# Patient Record
Sex: MALE | Race: WHITE | Employment: FULL TIME | URBAN - METROPOLITAN AREA
[De-identification: names, ages, dates, MRNs, and addresses within clinical notes are randomized per-mention and may not be internally consistent; named-entity substitution may affect disease eponyms.]

---

## 2023-05-12 ENCOUNTER — APPOINTMENT (OUTPATIENT)
Dept: GENERAL RADIOLOGY | Age: 38
End: 2023-05-12

## 2023-05-12 ENCOUNTER — HOSPITAL ENCOUNTER (EMERGENCY)
Age: 38
Discharge: HOME | End: 2023-05-12
Attending: EMERGENCY MEDICINE

## 2023-05-12 VITALS
RESPIRATION RATE: 16 BRPM | SYSTOLIC BLOOD PRESSURE: 144 MMHG | HEART RATE: 91 BPM | OXYGEN SATURATION: 96 % | DIASTOLIC BLOOD PRESSURE: 112 MMHG | TEMPERATURE: 97.7 F

## 2023-05-12 DIAGNOSIS — S93.491A SPRAIN OF ANTERIOR TALOFIBULAR LIGAMENT OF RIGHT ANKLE, INITIAL ENCOUNTER: Primary | ICD-10-CM

## 2023-05-12 PROCEDURE — 29515 APPLICATION SHORT LEG SPLINT: CPT

## 2023-05-12 PROCEDURE — 96372 THER/PROPH/DIAG INJ SC/IM: CPT

## 2023-05-12 PROCEDURE — 99284 EMERGENCY DEPT VISIT MOD MDM: CPT

## 2023-05-12 PROCEDURE — 73610 X-RAY EXAM OF ANKLE: CPT

## 2023-05-12 PROCEDURE — 6360000002 HC RX W HCPCS: Performed by: EMERGENCY MEDICINE

## 2023-05-12 RX ORDER — PYRAZINAMIDE 500 MG/1
1 TABLET ORAL EVERY 6 HOURS PRN
Qty: 20 TABLET | Refills: 0 | Status: SHIPPED | OUTPATIENT
Start: 2023-05-12 | End: 2023-05-17

## 2023-05-12 RX ORDER — IBUPROFEN 600 MG/1
600 TABLET ORAL EVERY 8 HOURS PRN
Qty: 30 TABLET | Refills: 0 | Status: SHIPPED | OUTPATIENT
Start: 2023-05-12 | End: 2023-05-22

## 2023-05-12 RX ORDER — KETOROLAC TROMETHAMINE 30 MG/ML
30 INJECTION, SOLUTION INTRAMUSCULAR; INTRAVENOUS
Status: COMPLETED | OUTPATIENT
Start: 2023-05-12 | End: 2023-05-12

## 2023-05-12 RX ADMIN — KETOROLAC TROMETHAMINE 30 MG: 30 INJECTION, SOLUTION INTRAMUSCULAR at 09:06

## 2023-05-12 ASSESSMENT — LIFESTYLE VARIABLES
HOW MANY STANDARD DRINKS CONTAINING ALCOHOL DO YOU HAVE ON A TYPICAL DAY: PATIENT DOES NOT DRINK
HOW OFTEN DO YOU HAVE A DRINK CONTAINING ALCOHOL: NEVER

## 2023-05-12 ASSESSMENT — ENCOUNTER SYMPTOMS: COLOR CHANGE: 1

## 2023-05-12 NOTE — ED NOTES
R ankle wrapped w/ ace wrap starting at the ball of foot up to knee, and ankle brace placed.      Shantal Deleon RN  05/12/23 7541

## 2023-05-12 NOTE — DISCHARGE INSTRUCTIONS
Do the ABC range of motion exercises with ice bucket treatment 4-5 times daily for the next 3 days. RICE: Rest, Ice, Compression & Elevation to decrease swelling to the affected area and help control pain. Use the NSAIDs Motrin 600 mg 3 times daily as prescribed with food for mild to moderate pain. Take the least amount of narcotic pain medicine possible for severe pain. Risk of opioid analgesics include, but are not limited to: Overdose they can stop or slow your breathing and lead to death; fractures from falls; drowsiness leading to injury; tolerance, dependence and addiction. You should not operate any motorized vehicles or work from a height greater than ground level when taking opioid analgesics as they increase your fall risks.

## 2023-05-12 NOTE — ED PROVIDER NOTES
Emergency Department Provider Note       PCP: None None   Age: 42 y.o. Sex: male     DISPOSITION Decision To Discharge 05/12/2023 09:38:48 AM       ICD-10-CM    1. Sprain of anterior talofibular ligament of right ankle, initial encounter  S93.491A acetaminophen-codeine (TYLENOL/CODEINE #3) 300-30 MG per tablet          Medical Decision Making     Complexity of Problems Addressed:  1 acute injury    Data Reviewed and Analyzed:  Category 1:   I independently ordered and reviewed each unique test.         Category 2:   I interpreted the X-rays no fracture. Category 3: Discussion of management or test interpretation. DDX:    Sprain, strain, tendon injury, contusion,    Abrasion, laceration, neurovascular injury, foreign body    Fracture, open fracture, dislocation, joint separation, articular surface injury,        Risk of Complications and/or Morbidity of Patient Management:      ED Course as of 05/12/23 0943   Fri May 12, 2023   0938 I talked to the patient and his significant other about the findings in the emergency department and management of his acute ankle sprain. The patient expressed understanding and agreement with management of his symptoms. [KH]      ED Course User Index  [KH] Shruthi Corona,        History      Clayton Geller is a 42 y.o. male who presents to the Emergency Department with chief complaint of    Chief Complaint   Patient presents with    Ankle Pain      Patient is a 78-year-old male presenting to the emerged department today complaining of a right ankle pain. The patient was stepping out of his truck and onto the ground and his foot turned on a rock in an inversion mechanism. The patient has been wearing an Ace wrap putting ice on it and taking Motrin at home but still having significant pain and limitations with range of motion. The patient denies any blunt head trauma. Review of Systems   Musculoskeletal:  Positive for joint swelling.    Skin:  Positive for color

## 2023-05-12 NOTE — ED TRIAGE NOTES
Patient arrives to ED pov from home. Patient reports he rolled his right ankle yesterday. Patient reports bruising and swelling. Patient reports pain with ambulation.

## 2023-05-12 NOTE — ED NOTES
I have reviewed discharge instructions with the patient. The patient verbalized understanding. Patient left ED via Discharge Method: wheelchair to Home with (insert name of family/friend, self, transport spouse). Opportunity for questions and clarification provided. Patient given 2 scripts. To continue your aftercare when you leave the hospital, you may receive an automated call from our care team to check in on how you are doing. This is a free service and part of our promise to provide the best care and service to meet your aftercare needs.  If you have questions, or wish to unsubscribe from this service please call [phone]. Thank you for Choosing our [facility] Emergency Department.         Layton Sales RN  05/12/23 5105

## 2023-06-22 ENCOUNTER — APPOINTMENT (OUTPATIENT)
Dept: GENERAL RADIOLOGY | Age: 38
End: 2023-06-22
Payer: OTHER MISCELLANEOUS

## 2023-06-22 ENCOUNTER — HOSPITAL ENCOUNTER (EMERGENCY)
Age: 38
Discharge: HOME | End: 2023-06-22
Attending: STUDENT IN AN ORGANIZED HEALTH CARE EDUCATION/TRAINING PROGRAM
Payer: OTHER MISCELLANEOUS

## 2023-06-22 VITALS
RESPIRATION RATE: 16 BRPM | HEIGHT: 72 IN | OXYGEN SATURATION: 96 % | DIASTOLIC BLOOD PRESSURE: 114 MMHG | BODY MASS INDEX: 36.03 KG/M2 | SYSTOLIC BLOOD PRESSURE: 162 MMHG | WEIGHT: 266 LBS | TEMPERATURE: 98 F | HEART RATE: 87 BPM
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
## 2023-06-22 DIAGNOSIS — M25.511 ACUTE PAIN OF RIGHT SHOULDER: ICD-10-CM

## 2023-06-22 PROCEDURE — 71046 X-RAY EXAM CHEST 2 VIEWS: CPT

## 2023-06-22 PROCEDURE — 73030 X-RAY EXAM OF SHOULDER: CPT

## 2023-06-22 PROCEDURE — 99283 EMERGENCY DEPT VISIT LOW MDM: CPT

## 2023-06-22 ASSESSMENT — PAIN - FUNCTIONAL ASSESSMENT: PAIN_FUNCTIONAL_ASSESSMENT: 0-10

## 2023-06-22 ASSESSMENT — PAIN SCALES - GENERAL
PAINLEVEL_OUTOF10: 5
PAINLEVEL_OUTOF10: 5
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
## 2023-06-23 NOTE — ED NOTES
I have reviewed discharge instructions with the patient. The patient verbalized understanding. Patient left ED via Discharge Method: ambulatory to Home with self. Opportunity for questions and clarification provided. Patient given 0 scripts. To continue your aftercare when you leave the hospital, you may receive an automated call from our care team to check in on how you are doing. This is a free service and part of our promise to provide the best care and service to meet your aftercare needs.  If you have questions, or wish to unsubscribe from this service please call [phone]. Thank you for Choosing our [address] [place] [address] Emergency Department.         Ruperto Richardson RN  06/22/23 2123

## 2023-06-23 NOTE — DISCHARGE INSTRUCTIONS
Continue alternating Tylenol and Motrin for discomfort. Follow-up with your family physician within 1 week. Return to the ER for worsening or worrisome symptoms. We would love to help you get a primary care doctor for follow-up after your emergency department visit. Please call [phone] between 7AM - 6PM Monday to Friday. A care navigator will be able to assist you with setting up a doctor close to your home.

## 2024-10-19 ENCOUNTER — HOSPITAL ENCOUNTER (EMERGENCY)
Age: 39
Discharge: HOME | End: 2024-10-19
Attending: EMERGENCY MEDICINE

## 2024-10-19 VITALS
HEIGHT: 73 IN | WEIGHT: 280 LBS | DIASTOLIC BLOOD PRESSURE: 98 MMHG | TEMPERATURE: 98 F | HEART RATE: 80 BPM | RESPIRATION RATE: 16 BRPM | OXYGEN SATURATION: 97 % | SYSTOLIC BLOOD PRESSURE: 138 MMHG | BODY MASS INDEX: 37.11 KG/M2

## 2024-10-19 DIAGNOSIS — H20.9 IRITIS OF LEFT EYE: Primary | ICD-10-CM

## 2024-10-19 PROCEDURE — 6370000000 HC RX 637 (ALT 250 FOR IP): Performed by: EMERGENCY MEDICINE

## 2024-10-19 PROCEDURE — 99283 EMERGENCY DEPT VISIT LOW MDM: CPT

## 2024-10-19 RX ORDER — CYCLOPENTOLATE HYDROCHLORIDE 10 MG/ML
1 SOLUTION/ DROPS OPHTHALMIC 2 TIMES DAILY
Qty: 5 ML | Refills: 0 | Status: SHIPPED | OUTPATIENT
Start: 2024-10-19

## 2024-10-19 RX ORDER — TETRACAINE HYDROCHLORIDE 5 MG/ML
2 SOLUTION OPHTHALMIC
Status: COMPLETED | OUTPATIENT
Start: 2024-10-19 | End: 2024-10-19

## 2024-10-19 RX ORDER — PREDNISOLONE ACETATE 10 MG/ML
1 SUSPENSION/ DROPS OPHTHALMIC 4 TIMES DAILY
Qty: 10 ML | Refills: 0 | Status: SHIPPED | OUTPATIENT
Start: 2024-10-19

## 2024-10-19 RX ADMIN — FLUORESCEIN SODIUM 1 MG: 1 STRIP OPHTHALMIC at 09:08

## 2024-10-19 RX ADMIN — TETRACAINE HYDROCHLORIDE 2 DROP: 5 SOLUTION OPHTHALMIC at 09:08

## 2024-10-19 ASSESSMENT — TONOMETRY
OS_IOP_MMHG: 25
OD_IOP_MMHG: 25

## 2024-10-19 ASSESSMENT — VISUAL ACUITY: OU: 1

## 2024-10-19 ASSESSMENT — PAIN SCALES - GENERAL: PAINLEVEL_OUTOF10: 5

## 2024-10-19 NOTE — ED TRIAGE NOTES
Patient arrived with a left eye redness, swelling, and pain. Started when he woke up with it yesterday. Clear drainage.

## 2024-10-19 NOTE — ED NOTES
Patient mobility status  with no difficulty. Provider aware     I have reviewed discharge instructions with the patient and spouse.  The patient and spouse verbalized understanding.    Patient left ED via Discharge Method: ambulatory to Home with Spouse.    Opportunity for questions and clarification provided.     Patient given 2 scripts.       Theresa Todd RN  10/19/24 0951

## 2024-10-19 NOTE — ED PROVIDER NOTES
Emergency Department Provider Note       PCP: None, None   Age: 39 y.o.   Sex: male     DISPOSITION Decision To Discharge 10/19/2024 09:35:48 AM  Condition at Disposition: Data Unavailable       ICD-10-CM    1. Iritis of left eye  H20.9           Medical Decision Making     Patient is being evaluated for painful right eye.  Consideration of corneal abrasion, iritis, uveitis, foreign body, acute angle glaucoma or other process.  Plan for thorough eye exam, fluorescein staining, and pressures.  ED Course as of 10/19/24 0941   Sat Oct 19, 2024   0940 I spoke with the ophthalmologist.  I explained my findings.  I told him I suspected possible iritis.  He recommended prescribing the patient prednisolone acetate and cyclopentolate eyedrops.  He plans to have the patient follow-up in the office on Monday.  Patient is to call the office if his symptoms acutely worsen over the weekend. [GORGE]      ED Course User Index  [GORGE] Carlota Doss, DO     1 or more acute illnesses that pose a threat to life or bodily function.   Prescription drug management performed.  Discussion with external consultants.  I independently ordered and reviewed each unique test.                         History     Patient presents with chief complaint of left eye pain, redness, and drainage.  Symptoms started yesterday morning.  Initially his eye was a little irritated and he felt maybe something was in it.  It then got better however when he woke up this morning he had increased drainage from the eye.  He is having increased sensitivity to light.  Denies any known direct trauma.  No contacts with pinkeye or other symptoms.    The history is provided by the patient.     Physical Exam     Vitals signs and nursing note reviewed:  Vitals:    10/19/24 0851 10/19/24 0856   BP: (!) 167/113    Pulse: 94    Resp: 18    Temp: 97.9 °F (36.6 °C)    TempSrc: Oral    SpO2: 94%    Weight:  127 kg (280 lb)   Height:  1.854 m (6' 1\")      Physical  not have a steady place to sleep: Not asked     Worried that the place you are staying is making you sick: Not asked        Previous Medications    IBUPROFEN (IBU) 600 MG TABLET    Take 1 tablet by mouth every 8 hours as needed for Pain        No results found for any visits on 10/19/24.      No orders to display                No results for input(s): \"COVID19\" in the last 72 hours.     Voice dictation software was used during the making of this note.  This software is not perfect and grammatical and other typographical errors may be present.  This note has not been completely proofread for errors.     Carlota Doss, DO  10/19/24 0980

## 2025-01-11 ENCOUNTER — HOSPITAL ENCOUNTER (EMERGENCY)
Age: 40
Discharge: HOME | End: 2025-01-11
Attending: EMERGENCY MEDICINE

## 2025-01-11 VITALS
HEIGHT: 72 IN | HEART RATE: 72 BPM | RESPIRATION RATE: 18 BRPM | DIASTOLIC BLOOD PRESSURE: 104 MMHG | TEMPERATURE: 98.1 F | SYSTOLIC BLOOD PRESSURE: 144 MMHG | OXYGEN SATURATION: 100 % | BODY MASS INDEX: 36.3 KG/M2 | WEIGHT: 268 LBS

## 2025-01-11 DIAGNOSIS — H20.9 IRITIS: Primary | ICD-10-CM

## 2025-01-11 LAB
ALBUMIN SERPL-MCNC: 3.7 G/DL (ref 3.5–5)
ALBUMIN/GLOB SERPL: 0.9 (ref 1–1.9)
ALP SERPL-CCNC: 75 U/L (ref 40–129)
ALT SERPL-CCNC: 33 U/L (ref 8–55)
ANION GAP SERPL CALC-SCNC: 11 MMOL/L (ref 7–16)
AST SERPL-CCNC: 26 U/L (ref 15–37)
BASOPHILS # BLD: 0.04 K/UL (ref 0–0.2)
BASOPHILS NFR BLD: 0.6 % (ref 0–2)
BILIRUB SERPL-MCNC: 0.5 MG/DL (ref 0–1.2)
BUN SERPL-MCNC: 13 MG/DL (ref 6–23)
CALCIUM SERPL-MCNC: 9.5 MG/DL (ref 8.8–10.2)
CHLORIDE SERPL-SCNC: 106 MMOL/L (ref 98–107)
CO2 SERPL-SCNC: 23 MMOL/L (ref 20–29)
CREAT SERPL-MCNC: 1.04 MG/DL (ref 0.8–1.3)
CRP SERPL HS-MCNC: 7.8 MG/L (ref 0–3)
DIFFERENTIAL METHOD BLD: ABNORMAL
EOSINOPHIL # BLD: 0.29 K/UL (ref 0–0.8)
EOSINOPHIL NFR BLD: 4.5 % (ref 0.5–7.8)
ERYTHROCYTE [DISTWIDTH] IN BLOOD BY AUTOMATED COUNT: 13.1 % (ref 11.9–14.6)
ERYTHROCYTE [SEDIMENTATION RATE] IN BLOOD: 19 MM/HR (ref 0–20)
GLOBULIN SER CALC-MCNC: 3.9 G/DL (ref 2.3–3.5)
GLUCOSE SERPL-MCNC: 96 MG/DL (ref 70–99)
HCT VFR BLD AUTO: 46 % (ref 41.1–50.3)
HGB BLD-MCNC: 14.9 G/DL (ref 13.6–17.2)
IMM GRANULOCYTES # BLD AUTO: 0.02 K/UL (ref 0–0.5)
IMM GRANULOCYTES NFR BLD AUTO: 0.3 % (ref 0–5)
LYMPHOCYTES # BLD: 1.31 K/UL (ref 0.5–4.6)
LYMPHOCYTES NFR BLD: 20.5 % (ref 13–44)
MCH RBC QN AUTO: 25 PG (ref 26.1–32.9)
MCHC RBC AUTO-ENTMCNC: 32.4 G/DL (ref 31.4–35)
MCV RBC AUTO: 77.3 FL (ref 82–102)
MONOCYTES # BLD: 0.47 K/UL (ref 0.1–1.3)
MONOCYTES NFR BLD: 7.3 % (ref 4–12)
NEUTS SEG # BLD: 4.27 K/UL (ref 1.7–8.2)
NEUTS SEG NFR BLD: 66.8 % (ref 43–78)
NRBC # BLD: 0 K/UL (ref 0–0.2)
PLATELET # BLD AUTO: 215 K/UL (ref 150–450)
PMV BLD AUTO: 9.1 FL (ref 9.4–12.3)
POTASSIUM SERPL-SCNC: 3.9 MMOL/L (ref 3.5–5.1)
PROT SERPL-MCNC: 7.6 G/DL (ref 6.3–8.2)
RBC # BLD AUTO: 5.95 M/UL (ref 4.23–5.6)
SODIUM SERPL-SCNC: 140 MMOL/L (ref 136–145)
T PALLIDUM AB SER QL IA: NONREACTIVE
WBC # BLD AUTO: 6.4 K/UL (ref 4.3–11.1)

## 2025-01-11 PROCEDURE — 82157 ASSAY OF ANDROSTENEDIONE: CPT

## 2025-01-11 PROCEDURE — 86141 C-REACTIVE PROTEIN HS: CPT

## 2025-01-11 PROCEDURE — 6370000000 HC RX 637 (ALT 250 FOR IP): Performed by: EMERGENCY MEDICINE

## 2025-01-11 PROCEDURE — 99283 EMERGENCY DEPT VISIT LOW MDM: CPT

## 2025-01-11 PROCEDURE — 81374 HLA I TYPING 1 ANTIGEN LR: CPT

## 2025-01-11 PROCEDURE — 85025 COMPLETE CBC W/AUTO DIFF WBC: CPT

## 2025-01-11 PROCEDURE — 86480 TB TEST CELL IMMUN MEASURE: CPT

## 2025-01-11 PROCEDURE — 83516 IMMUNOASSAY NONANTIBODY: CPT

## 2025-01-11 PROCEDURE — 85652 RBC SED RATE AUTOMATED: CPT

## 2025-01-11 PROCEDURE — 86037 ANCA TITER EACH ANTIBODY: CPT

## 2025-01-11 PROCEDURE — 86430 RHEUMATOID FACTOR TEST QUAL: CPT

## 2025-01-11 PROCEDURE — 86780 TREPONEMA PALLIDUM: CPT

## 2025-01-11 PROCEDURE — 80053 COMPREHEN METABOLIC PANEL: CPT

## 2025-01-11 PROCEDURE — 82164 ANGIOTENSIN I ENZYME TEST: CPT

## 2025-01-11 RX ORDER — TETRACAINE HYDROCHLORIDE 5 MG/ML
1 SOLUTION OPHTHALMIC ONCE
Status: COMPLETED | OUTPATIENT
Start: 2025-01-11 | End: 2025-01-11

## 2025-01-11 RX ORDER — PREDNISOLONE ACETATE 10 MG/ML
SUSPENSION/ DROPS OPHTHALMIC
Qty: 5 ML | Refills: 0 | Status: SHIPPED | OUTPATIENT
Start: 2025-01-11 | End: 2025-02-08

## 2025-01-11 RX ORDER — CYCLOPENTOLATE HYDROCHLORIDE 10 MG/ML
1 SOLUTION/ DROPS OPHTHALMIC 2 TIMES DAILY
Qty: 5 ML | Refills: 0 | Status: SHIPPED | OUTPATIENT
Start: 2025-01-11

## 2025-01-11 RX ADMIN — TETRACAINE HYDROCHLORIDE 1 DROP: 5 SOLUTION OPHTHALMIC at 09:32

## 2025-01-11 ASSESSMENT — ENCOUNTER SYMPTOMS
SHORTNESS OF BREATH: 0
EYE REDNESS: 1
EYE PAIN: 1
COUGH: 0
RHINORRHEA: 0

## 2025-01-11 ASSESSMENT — LIFESTYLE VARIABLES
HOW OFTEN DO YOU HAVE A DRINK CONTAINING ALCOHOL: MONTHLY OR LESS
HOW MANY STANDARD DRINKS CONTAINING ALCOHOL DO YOU HAVE ON A TYPICAL DAY: 1 OR 2

## 2025-01-11 ASSESSMENT — PAIN - FUNCTIONAL ASSESSMENT: PAIN_FUNCTIONAL_ASSESSMENT: 0-10

## 2025-01-11 ASSESSMENT — PAIN SCALES - GENERAL: PAINLEVEL_OUTOF10: 5

## 2025-01-11 NOTE — ED PROVIDER NOTES
Emergency Department Provider Note       PCP: None, None   Age: 39 y.o.   Sex: male     DISPOSITION                No diagnosis found.    Medical Decision Making     I spoke with on-call for ophthalmology who kindly agreed to come to the ER to evaluate the patient     1 acute complicated illness or injury.  Prescription drug management performed.  Shared medical decision making was utilized in creating the patients health plan today.    I independently ordered and reviewed each unique test.           The management of this patient was discussed with an external consultant.              Patient was seen and evaluated by the ophthalmologist.  Please see her note for specific details.    History     39-year-old gentleman presents with concerns about painful red eye with blurry vision in his right eye.  He said something similar happened in October with his left eye.  He denies any injury or trauma.  He denies any fevers or chills.  He says he has no headache and the pain is isolated to his eye.    No other associated symptoms.    Elements of this note were created using speech recognition software.  As such, errors of speech recognition may be present.      ROS     Review of Systems   Constitutional:  Negative for chills and fever.   HENT:  Negative for congestion and rhinorrhea.    Eyes:  Positive for pain, redness and visual disturbance.   Respiratory:  Negative for cough and shortness of breath.    Neurological:  Negative for dizziness and light-headedness.        Physical Exam     Vitals signs and nursing note reviewed:  Vitals:    01/11/25 0927   BP: (!) 153/105   Pulse: 92   Resp: 17   Temp: 98.1 °F (36.7 °C)   SpO2: 98%   Weight: 121.6 kg (268 lb)   Height: 1.829 m (6')      Physical Exam  Constitutional:       Appearance: Normal appearance.   Eyes:      Comments: Significant right scleral injection.  Pressure in the right eye was between 23 and 26.  Left eye the pressure was 12.    Vision in the right eye  results for input(s): \"COVID19\" in the last 72 hours.    Voice dictation software was used during the making of this note.  This software is not perfect and grammatical and other typographical errors may be present.  This note has not been completely proofread for errors.        Jakub Briceno MD  01/11/25 1138

## 2025-01-11 NOTE — DISCHARGE INSTRUCTIONS
Please return with any fevers, vomiting, worsening symptoms, or additional concerns.    Please follow-up with Dr. Rajan for further evaluation.    Gabriela Rajan MD  [address] [address] [phone] ([facility])

## 2025-01-11 NOTE — CONSULTS
Ophthalmology Consult Note    CC: Red eye, irritation, photophobia OD    HPI: 38 yo  male - hx of having a similar episode w/redness OS in October when he was treated with Pred and Cyclopentolate OS and could not follow up due to logistics and lack of insurance, recovered well from this with no history of autoimmune disease, comes to ED for red eye OD, started to notice some tearing and irritation about 2 days ago, he thought maybe he got something in it and washed it out with water and it seemed ok, watering at night, but much worse last night, it started \"draining really bad\" and he came to the ED today. Does not habitually wear glasses or contacts. No history of trauma, no grinding etc. Works at [employer] currently. No recent new symptoms - no joint pain, sore throat, diarrhea, or rashes. OS not bothering him. No history of liver issues.    PMHx: gum disease, he says it runs in his family, otherwise negative    Meds:    VA: per ED on wall chart, 20/100 OD 20/20 OS  IOP: OD 24, OS 8 on tonopen (LRN)  Pupils: about 2 mm, normal/round, reactive to light, no APD  EOMs: WNL, some pain on upgaze  CVFs: deferred due to photophobia    Slit lamp exam:    Conj/Sclera:   OD 2+ injection, OS tr injection  Cornea: no staining OU, 1 large mutton-fat KP OD inferotempo  AC: 1+ heavy flare OD but still able to make out iris detals well, OS no cell or flare  Iris: posterior synechiae OU, ~ 3 mm pupil after dilation  Lens: mild NS OU, pigment on ant capsule OS    DFE:  Significant posterior synechiae normal red reflex but unable to visualize other details    A/P: 38 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 No relevant history to explain etiology.  - Obtaining serum workup with ED: CBC w/diff, ESR, CRP, CMP, RF, ITALIA, RPR, either FTA-ABS or treponemal IgG, ACE, HLA-B27, quant for TB, ANCA panel.   - Start treatment with Prednisolone QID OD - QID x 1 week, TID x 1

## 2025-01-11 NOTE — ED NOTES
I have reviewed discharge instructions with the patient.  The patient verbalized understanding.    Patient left ED via Discharge Method: ambulatory to Home with family.    Opportunity for questions and clarification provided.       Patient given 0 scripts.         To continue your aftercare when you leave the hospital, you may receive an automated call from our care team to check in on how you are doing.  This is a free service and part of our promise to provide the best care and service to meet your aftercare needs.” If you have questions, or wish to unsubscribe from this service please call [phone].  Thank you for Choosing our [facility] Emergency Department.        Maren Austin LPN  01/11/25 3548

## 2025-01-11 NOTE — ED TRIAGE NOTES
Patient arrived with a right eye problems with redness, vision loss per patient. Reports of irritation for 2 days. Bilateral eyes are red but right eye is more red.

## 2025-01-13 LAB — RHEUMATOID FACT SER QL LA: NEGATIVE

## 2025-01-14 LAB
ACE SERPL-CCNC: 107 U/L (ref 14–82)
C-ANCA TITR SER IF: NORMAL TITER
MYELOPEROXIDASE AB SER IA-ACNC: <0.2 UNITS (ref 0–0.9)
P-ANCA ATYPICAL TITR SER IF: NORMAL TITER
P-ANCA TITR SER IF: NORMAL TITER
PROTEINASE3 AB SER IA-ACNC: <0.2 UNITS (ref 0–0.9)

## 2025-01-16 LAB
ANDROST SERPL-MCNC: 21 NG/DL (ref 27–152)
M TB IFN-G BLD-IMP: NEGATIVE
M TB IFN-G CD4+ T-CELLS BLD-ACNC: 0.06 IU/ML
M TBIFN-G CD4+ CD8+T-CELLS BLD-ACNC: 0.07 IU/ML
QUANTIFERON CRITERIA: NORMAL
QUANTIFERON MITOGEN VALUE: >10 IU/ML
QUANTIFERON NIL VALUE: 0.05 IU/ML
QUANTIFERON, INCUBATION: NORMAL

## 2025-01-17 LAB — HLA-B27 QL NAA+PROBE: POSITIVE
